# Patient Record
Sex: FEMALE | Race: WHITE | Employment: OTHER | URBAN - METROPOLITAN AREA
[De-identification: names, ages, dates, MRNs, and addresses within clinical notes are randomized per-mention and may not be internally consistent; named-entity substitution may affect disease eponyms.]

---

## 2017-02-27 ENCOUNTER — OFFICE VISIT (OUTPATIENT)
Dept: FAMILY MEDICINE CLINIC | Age: 67
End: 2017-02-27

## 2017-02-27 VITALS
HEART RATE: 66 BPM | OXYGEN SATURATION: 96 % | BODY MASS INDEX: 29.26 KG/M2 | HEIGHT: 62 IN | WEIGHT: 159 LBS | RESPIRATION RATE: 16 BRPM | SYSTOLIC BLOOD PRESSURE: 146 MMHG | TEMPERATURE: 96.1 F | DIASTOLIC BLOOD PRESSURE: 76 MMHG

## 2017-02-27 DIAGNOSIS — M79.644 THUMB PAIN, RIGHT: Primary | ICD-10-CM

## 2017-02-27 DIAGNOSIS — L98.9 SKIN LESION: ICD-10-CM

## 2017-02-27 NOTE — PROGRESS NOTES
Chief Complaint   Patient presents with    Finger Swelling     Right thumb hurting hx of trigger finger. Wearing brace without relief.

## 2017-02-27 NOTE — PROGRESS NOTES
HISTORY OF PRESENT ILLNESS  Cleveland Amaya is a 78 y.o. female here today w 2mths right thumb pain and catching w flexion of IP joint, no injury. She has been using thumb spica splint w/o relief. Also has skin lesion under right bra strap. Finger Swelling   The history is provided by the patient. This is a new problem. The current episode started more than 1 week ago. Review of Systems   Constitutional: Negative for malaise/fatigue. Respiratory: Negative. Cardiovascular: Negative. Musculoskeletal:        See HPI   Skin:        See HPI       Physical Exam   Constitutional: She is oriented to person, place, and time. She appears well-developed and well-nourished. /76 (BP 1 Location: Left arm, BP Patient Position: Sitting)  Pulse 66  Temp 96.1 °F (35.6 °C) (Oral)   Resp 16  Ht 5' 1.5\" (1.562 m)  Wt 159 lb (72.1 kg)  LMP 01/01/1983 (Approximate)  SpO2 96%  BMI 29.56 kg/m2     Cardiovascular: Normal heart sounds. Pulmonary/Chest: Breath sounds normal.   Abdominal: Soft. Musculoskeletal:   Right thumb no TTP good ROM but does have \"catching\" of IP on flexion   Neurological: She is alert and oriented to person, place, and time. Skin:   Benign raised, scaly 2mm lesion under right bra strap, benign appearing. Cryotherapy applied w liquid Nitrogen in freeze thaw method   Psychiatric: She has a normal mood and affect. Nursing note and vitals reviewed.     Patient Active Problem List   Diagnosis Code    Essential hypertension I10    Hyperlipidemia E78.5     Past Medical History:   Diagnosis Date    Arthritis     [facility] [place] [facility] [address]     Cervical cancer ([facility] [address] 1983    cervix and uterus    Hypertension     Thyroid disease      Social History     Social History    Marital status:      Spouse name: N/A    Number of children: N/A    Years of education: N/A     Social History Main Topics    Smoking status: Former Smoker     Packs/day: 1.00     Years: 5.00     Quit date: 3/17/1975    Smokeless tobacco: Never Used    Alcohol use No    Drug use: No    Sexual activity: Not Currently     Partners: Male     Other Topics Concern    None     Social History Narrative     Family History   Problem Relation Age of Onset    Hypertension Mother     Elevated Lipids Mother     Cancer Father     Dementia Father     No Known Problems Brother      Current Outpatient Prescriptions   Medication Sig    lovastatin (MEVACOR) 20 mg tablet TAKE TWO TABLETS BY MOUTH AT BEDTIME    lisinopril-hydrochlorothiazide (PRINZIDE, ZESTORETIC) 20-25 mg per tablet Take 1 Tab by mouth daily.  metoprolol tartrate (LOPRESSOR) 50 mg tablet TAKE ONE TABLET BY MOUTH TWICE DAILY    diphenhydrAMINE (BENADRYL) 25 mg capsule Take 25 mg by mouth every six (6) hours as needed.  acetaminophen (TYLENOL EXTRA STRENGTH) 500 mg tablet Take  by mouth every six (6) hours as needed for Pain.  fluticasone (FLONASE) 50 mcg/actuation nasal spray 2 sprays each nostril daily     Allergies   Allergen Reactions    Codeine Anaphylaxis    Lisinopril Cough         ASSESSMENT and PLAN  Pt will follow up w hand surgeon, information provided. Cryotherapy to skin lesion. Care plan reviewed and pt understands. After visit summary printed and reviewed with patient. Caren was seen today for finger swelling.     Diagnoses and all orders for this visit:    Thumb pain, right    Skin lesion

## 2017-05-02 ENCOUNTER — HOSPITAL ENCOUNTER (OUTPATIENT)
Dept: LAB | Age: 67
Discharge: HOME | End: 2017-05-02
Payer: MEDICARE

## 2017-05-02 ENCOUNTER — OFFICE VISIT (OUTPATIENT)
Dept: FAMILY MEDICINE CLINIC | Age: 67
End: 2017-05-02

## 2017-05-02 VITALS
WEIGHT: 157.6 LBS | BODY MASS INDEX: 29 KG/M2 | DIASTOLIC BLOOD PRESSURE: 69 MMHG | HEART RATE: 67 BPM | TEMPERATURE: 96.6 F | SYSTOLIC BLOOD PRESSURE: 155 MMHG | HEIGHT: 62 IN | RESPIRATION RATE: 16 BRPM | OXYGEN SATURATION: 97 %

## 2017-05-02 DIAGNOSIS — I10 ESSENTIAL HYPERTENSION: Primary | ICD-10-CM

## 2017-05-02 DIAGNOSIS — E78.5 HYPERLIPIDEMIA, UNSPECIFIED HYPERLIPIDEMIA TYPE: ICD-10-CM

## 2017-05-02 PROCEDURE — 80053 COMPREHEN METABOLIC PANEL: CPT

## 2017-05-02 PROCEDURE — 36415 COLL VENOUS BLD VENIPUNCTURE: CPT

## 2017-05-02 PROCEDURE — 80061 LIPID PANEL: CPT

## 2017-05-02 RX ORDER — LISINOPRIL AND HYDROCHLOROTHIAZIDE 20; 25 MG/1; MG/1
1 TABLET ORAL DAILY
Qty: 90 TAB | Refills: 3 | Status: SHIPPED | OUTPATIENT
Start: 2017-05-02

## 2017-05-02 RX ORDER — LOVASTATIN 20 MG/1
TABLET ORAL
Qty: 180 TAB | Refills: 1 | Status: CANCELLED | OUTPATIENT
Start: 2017-05-02

## 2017-05-02 RX ORDER — ATORVASTATIN CALCIUM 40 MG/1
40 TABLET, FILM COATED ORAL DAILY
Qty: 90 TAB | Refills: 3 | Status: SHIPPED | OUTPATIENT
Start: 2017-05-02

## 2017-05-02 RX ORDER — CALC/MAG/B COMPLEX/D3/HERB 61
TABLET ORAL
COMMUNITY
End: 2017-05-02

## 2017-05-02 RX ORDER — METOPROLOL TARTRATE 50 MG/1
TABLET ORAL
Qty: 180 TAB | Refills: 3 | Status: SHIPPED | OUTPATIENT
Start: 2017-05-02

## 2017-05-02 NOTE — MR AVS SNAPSHOT
Visit Information Date & Time Provider Department Dept. Phone Encounter #  
 5/2/2017  7:30 AM Kiel Muller MD [facility] [phone] [id number] Upcoming Health Maintenance Date Due OSTEOPOROSIS SCREENING (DEXA) 8/24/2014 MEDICARE YEARLY EXAM 8/24/2014 Pneumococcal 65+ Low/Medium Risk (2 of 2 - PCV13) 8/28/2016 INFLUENZA AGE 9 TO ADULT 8/1/2017 BREAST CANCER SCRN MAMMOGRAM 5/4/2018 GLAUCOMA SCREENING Q2Y 6/3/2018 DTaP/Tdap/Td series (2 - Td) 5/22/2025 Allergies as of 5/2/2017  Review Complete On: 5/2/2017 By: Roxie Batista LPN Severity Noted Reaction Type Reactions Codeine High 03/17/2015    Anaphylaxis Lisinopril  03/17/2015    Cough Current Immunizations  Reviewed on 8/28/2015 Name Date Pneumococcal Polysaccharide (PPSV-23) 8/28/2015 Tdap 5/22/2015 Not reviewed this visit You Were Diagnosed With   
  
 Codes Comments Essential hypertension    -  Primary ICD-10-CM: I10 
ICD-9-CM: 401.9 Hyperlipidemia, unspecified hyperlipidemia type     ICD-10-CM: E78.5 ICD-9-CM: 272.4 Vitals BP Pulse Temp Resp Height(growth percentile) Weight(growth percentile) 155/69 (BP 1 Location: Right arm, BP Patient Position: Sitting) 67 96.6 °F (35.9 °C) (Oral) 16 5' 1.5\" (1.562 m) 157 lb 9.6 oz (71.5 kg) LMP SpO2 BMI OB Status Smoking Status 01/01/1983 (Approximate) 97% 29.3 kg/m2 Hysterectomy Former Smoker Vitals History BMI and BSA Data Body Mass Index Body Surface Area  
 29.3 kg/m 2 1.76 m 2 Preferred Pharmacy Pharmacy Name Phone [facility] [address] [place] Susi Boggs [phone] Your Updated Medication List  
  
   
This list is accurate as of: 5/2/17  8:09 AM.  Always use your most recent med list.  
  
  
  
  
 atorvastatin 40 mg tablet Commonly known as:  LIPITOR Take 1 Tab by mouth daily. fluticasone 50 mcg/actuation nasal spray Commonly known as:  FLONASE  
2 sprays each nostril daily  
  
 lisinopril-hydroCHLOROthiazide 20-25 mg per tablet Commonly known as:  Nicci Karvonen Take 1 Tab by mouth daily. metoprolol tartrate 50 mg tablet Commonly known as:  LOPRESSOR  
TAKE ONE TABLET BY MOUTH TWICE DAILY  
  
 TYLENOL EXTRA STRENGTH 500 mg tablet Generic drug:  acetaminophen Take  by mouth every six (6) hours as needed for Pain. Prescriptions Sent to Pharmacy Refills  
 metoprolol tartrate (LOPRESSOR) 50 mg tablet 3 Sig: TAKE ONE TABLET BY MOUTH TWICE DAILY Class: Normal  
 [place] [facility] [place] [id number] [place] [facility] Ph #: [phone]  
 lisinopril-hydroCHLOROthiazide (PRINZIDE, ZESTORETIC) 20-25 mg per tablet 3 Sig: Take 1 Tab by mouth daily. Class: Normal  
 [place] [facility] [place] [id number] [place] [facility] Ph #: [phone] Route: Oral  
 atorvastatin (LIPITOR) 40 mg tablet 3 Sig: Take 1 Tab by mouth daily. Class: Normal  
 Pharmacy: [facility] [place] [id number] [place] [facility] Ph #: [phone] Route: Oral  
  
We Performed the Following LIPID PANEL [07610 CPT(R)] METABOLIC PANEL, COMPREHENSIVE [55109 CPT(R)] To-Do List   
 05/04/2017 9:30 AM  
  Appointment with [facility] at [facility] ([phone]) Shower or bathe using soap and water. Do not use deodorant, powder, perfumes, or lotion the day of your exam.  If your prior mammograms were not performed at [facility] please bring films with you or forward prior images 2 days before your procedure. Check in at registration 15min before your appointment time unless you were instructed to do otherwise. A script is not necessary, but if you have one, please bring it on the day of the mammogram or have it faxed to the department. [facility] [phone] [facility]  [phone] [facility] [address]  [phone] [facility] [phone] [facility] [address] [facility] [phone] Introducing \[facility]\"" & HEALTH SERVICES! Derrek Singh introduces [employer] patient portal. Now you can access parts of your medical record, email your doctor's office, and request medication refills online. 1. In your internet browser, go to [email or web address] 2. Click on the First Time User? Click Here link in the Sign In box. You will see the New Member Sign Up page. 3. Enter your [employer] Access Code exactly as it appears below. You will not need to use this code after youve completed the sign-up process. If you do not sign up before the expiration date, you must request a new code. · [employer] Access Code: [email or web address] Expires: 7/31/2017  8:09 AM 
 
4. Enter the last four digits of your Social Security Number (xxxx) and Date of Birth (mm/dd/yyyy) as indicated and click Submit. You will be taken to the next sign-up page. 5. Create a [employer] ID. This will be your [employer] login ID and cannot be changed, so think of one that is secure and easy to remember. 6. Create a [employer] password. You can change your password at any time. 7. Enter your Password Reset Question and Answer. This can be used at a later time if you forget your password. 8. Enter your e-mail address. You will receive e-mail notification when new information is available in [address] 9. Click Sign Up. You can now view and download portions of your medical record. 10. Click the Download Summary menu link to download a portable copy of your medical information. If you have questions, please visit the Frequently Asked Questions section of the [employer] website. Remember, [employer] is NOT to be used for urgent needs. For medical emergencies, dial 911. Now available from your iPhone and Android! Please provide this summary of care documentation to your next provider. Your primary care clinician is listed as Jozef Pitt. If you have any questions after today's visit, please call [phone].

## 2017-05-02 NOTE — PROGRESS NOTES
HISTORY OF PRESENT ILLNESS  Brandy Ram is a 78 y.o. female here today for follow up of HTN and HLD, feeling well on her medication. She will be moving to [place] and needs her refills so that she can transfer Rxs when she moves. She would like to switch her Lovastatin to Atorvastatin because it is inconvenient to go to [place]. Cholesterol Problem   The history is provided by the patient. This is a chronic problem. The current episode started more than 1 week ago. The problem has not changed since onset. Pertinent negatives include no chest pain, no abdominal pain and no shortness of breath. Hypertension    The history is provided by the patient. This is a chronic problem. The current episode started more than 1 week ago. Pertinent negatives include no chest pain, no dizziness and no shortness of breath. Review of Systems   Respiratory: Negative for shortness of breath. Cardiovascular: Negative for chest pain. Gastrointestinal: Negative for abdominal pain. Neurological: Negative for dizziness. Physical Exam   Constitutional: She is oriented to person, place, and time. She appears well-developed and well-nourished. /69 (BP 1 Location: Right arm, BP Patient Position: Sitting)  Pulse 67  Temp 96.6 °F (35.9 °C) (Oral)   Resp 16  Ht 5' 1.5\" (1.562 m)  Wt 157 lb 9.6 oz (71.5 kg)  LMP 01/01/1983 (Approximate)  SpO2 97%  BMI 29.3 kg/m2     HENT:   Head: Normocephalic and atraumatic. Cardiovascular: Normal heart sounds. Pulmonary/Chest: Breath sounds normal.   Abdominal: Soft. There is no tenderness. Musculoskeletal: She exhibits no edema. Neurological: She is alert and oriented to person, place, and time. Psychiatric: She has a normal mood and affect. Nursing note and vitals reviewed.     Patient Active Problem List   Diagnosis Code    Essential hypertension I10    Hyperlipidemia E78.5     Past Medical History:   Diagnosis Date    Arthritis     [facility] [address]     Cervical cancer ([facility] [address] 1983    cervix and uterus    Hypertension     Thyroid disease      Social History     Social History    Marital status:      Spouse name: N/A    Number of children: N/A    Years of education: N/A     Social History Main Topics    Smoking status: Former Smoker     Packs/day: 1.00     Years: 5.00     Quit date: 3/17/1975    Smokeless tobacco: Never Used    Alcohol use No    Drug use: No    Sexual activity: Not Currently     Partners: Male     Other Topics Concern    None     Social History Narrative     Family History   Problem Relation Age of Onset    Hypertension Mother     Elevated Lipids Mother     Cancer Father     Dementia Father     No Known Problems Brother      Current Outpatient Prescriptions   Medication Sig    metoprolol tartrate (LOPRESSOR) 50 mg tablet TAKE ONE TABLET BY MOUTH TWICE DAILY    lisinopril-hydroCHLOROthiazide (PRINZIDE, ZESTORETIC) 20-25 mg per tablet Take 1 Tab by mouth daily.  atorvastatin (LIPITOR) 40 mg tablet Take 1 Tab by mouth daily.  acetaminophen (TYLENOL EXTRA STRENGTH) 500 mg tablet Take  by mouth every six (6) hours as needed for Pain.  fluticasone (FLONASE) 50 mcg/actuation nasal spray 2 sprays each nostril daily     Allergies   Allergen Reactions    Codeine Anaphylaxis    Lisinopril Cough         ASSESSMENT and PLAN  BP looks great, fasting labs pending. Care plan reviewed and pt understands. After visit summary printed and reviewed with patient. Caren was seen today for cholesterol problem and hypertension. Diagnoses and all orders for this visit:    Essential hypertension  -     metoprolol tartrate (LOPRESSOR) 50 mg tablet; TAKE ONE TABLET BY MOUTH TWICE DAILY  -     lisinopril-hydroCHLOROthiazide (PRINZIDE, ZESTORETIC) 20-25 mg per tablet; Take 1 Tab by mouth daily.  -     METABOLIC PANEL, COMPREHENSIVE    Hyperlipidemia, unspecified hyperlipidemia type  -     atorvastatin (LIPITOR) 40 mg tablet;  Take 1 Tab by mouth daily.  -     METABOLIC PANEL, COMPREHENSIVE  -     LIPID PANEL    Other orders  -     Cancel: lovastatin (MEVACOR) 20 mg tablet; TAKE TWO TABLETS BY MOUTH AT BEDTIME

## 2017-05-02 NOTE — PROGRESS NOTES
Chief Complaint   Patient presents with    Cholesterol Problem    Hypertension     Check meds & labs, fasting

## 2017-05-03 LAB
ALBUMIN SERPL-MCNC: 4.5 G/DL (ref 3.6–4.8)
ALBUMIN/GLOB SERPL: 1.9 {RATIO} (ref 1.2–2.2)
ALP SERPL-CCNC: 77 IU/L (ref 39–117)
ALT SERPL-CCNC: 21 IU/L (ref 0–32)
AST SERPL-CCNC: 18 IU/L (ref 0–40)
BILIRUB SERPL-MCNC: 0.5 MG/DL (ref 0–1.2)
BUN SERPL-MCNC: 13 MG/DL (ref 8–27)
BUN/CREAT SERPL: 16 (ref 12–28)
CALCIUM SERPL-MCNC: 9.5 MG/DL (ref 8.7–10.3)
CHLORIDE SERPL-SCNC: 101 MMOL/L (ref 96–106)
CHOLEST SERPL-MCNC: 209 MG/DL (ref 100–199)
CO2 SERPL-SCNC: 24 MMOL/L (ref 18–29)
CREAT SERPL-MCNC: 0.79 MG/DL (ref 0.57–1)
GLOBULIN SER CALC-MCNC: 2.4 G/DL (ref 1.5–4.5)
GLUCOSE SERPL-MCNC: 98 MG/DL (ref 65–99)
HDLC SERPL-MCNC: 80 MG/DL
LDLC SERPL CALC-MCNC: 108 MG/DL (ref 0–99)
POTASSIUM SERPL-SCNC: 4.3 MMOL/L (ref 3.5–5.2)
PROT SERPL-MCNC: 6.9 G/DL (ref 6–8.5)
SODIUM SERPL-SCNC: 142 MMOL/L (ref 134–144)
TRIGL SERPL-MCNC: 103 MG/DL (ref 0–149)
VLDLC SERPL CALC-MCNC: 21 MG/DL (ref 5–40)

## 2017-05-04 ENCOUNTER — HOSPITAL ENCOUNTER (OUTPATIENT)
Dept: MAMMOGRAPHY | Age: 67
Discharge: HOME | End: 2017-05-04
Attending: INTERNAL MEDICINE
Payer: MEDICARE

## 2017-05-04 DIAGNOSIS — Z12.31 VISIT FOR SCREENING MAMMOGRAM: ICD-10-CM

## 2017-05-04 PROCEDURE — 77067 SCR MAMMO BI INCL CAD: CPT
